# Patient Record
Sex: FEMALE | Race: WHITE | NOT HISPANIC OR LATINO | Employment: FULL TIME | URBAN - METROPOLITAN AREA
[De-identification: names, ages, dates, MRNs, and addresses within clinical notes are randomized per-mention and may not be internally consistent; named-entity substitution may affect disease eponyms.]

---

## 2023-10-13 ENCOUNTER — OFFICE VISIT (OUTPATIENT)
Dept: INTERNAL MEDICINE | Facility: CLINIC | Age: 43
End: 2023-10-13
Payer: COMMERCIAL

## 2023-10-13 VITALS
SYSTOLIC BLOOD PRESSURE: 128 MMHG | HEART RATE: 88 BPM | RESPIRATION RATE: 18 BRPM | OXYGEN SATURATION: 98 % | HEIGHT: 66 IN | DIASTOLIC BLOOD PRESSURE: 84 MMHG | BODY MASS INDEX: 24.78 KG/M2 | TEMPERATURE: 97.3 F | WEIGHT: 154.2 LBS

## 2023-10-13 DIAGNOSIS — Z12.31 ENCOUNTER FOR SCREENING MAMMOGRAM FOR MALIGNANT NEOPLASM OF BREAST: ICD-10-CM

## 2023-10-13 DIAGNOSIS — Z23 FLU VACCINE NEED: ICD-10-CM

## 2023-10-13 DIAGNOSIS — L98.9 FACIAL LESION: ICD-10-CM

## 2023-10-13 DIAGNOSIS — Z12.11 COLON CANCER SCREENING: ICD-10-CM

## 2023-10-13 DIAGNOSIS — Z00.00 ANNUAL PHYSICAL EXAM: Primary | ICD-10-CM

## 2023-10-13 DIAGNOSIS — Z23 NEED FOR TDAP VACCINATION: ICD-10-CM

## 2023-10-13 NOTE — LETTER
"[facility]  Vaccine Consent Form    Patient Name:  Deepika Caban  Patient :  1980     Vaccine(s) Ordered    Fluzone >6 Months ([id number])  Tdap Vaccine Greater Than or Equal To 8yo IM        Screening Checklist  The following questions should be completed prior to vaccination. If you answer "yes" to any question, it does not necessarily mean you should not be vaccinated. It just means we may need to clarify or ask more questions. If a question is unclear, please ask your healthcare provider to explain it.    Yes No   Any fever or moderate to severe illness today (mild illness and/or antibiotic treatment are not contraindications)?     Do you have a history of a serious reaction to any previous vaccinations, such as anaphylaxis, encephalopathy within 7 days, Guillain-Orient syndrome within 6 weeks, seizure?     Have you received any live vaccine(s) in the past month (MMR, ROSALINE)?     Do you have an anaphylactic allergy to latex (DTaP, DTaP-IPV, Hep A, Hep B, MenB, RV, Td, Tdap), baker's yeast (Hep B, HPV), or gelatin (ROSALINE, MMR)?     Do you have an anaphylactic allergy to neomycin (Rabies, ROSALINE, MMR, IPV, Hep A), polymyxin B (IPV), or streptomycin (IPV)?      Any cancer, leukemia, AIDS, or other immune system disorder? (ROSALINE, MMR, RV)     Do you have a parent, brother, or sister with an immune system problem (if immune competence of vaccine recipient clinically verified, can proceed)? (MMR, ROSALINE)     Any recent steroid treatments for >2 weeks, chemotherapy, or radiation treatment? (ROSALINE, MMR)     Have you received antibody-containing blood transfusions or IVIG in the past 11 months (recommended interval is dependent on product)? (MMR, ROSALINE)     Have you taken antiviral drugs (acyclovir, famciclovir, valacyclovir) in the last 24 or 48 hours, respectively (ROSALINE)?      Are you pregnant or planning to become pregnant within 1 month? (ROSALINE, MMR, HPV, IPV, MenB; For hep B- refer to Engerix-B)     For infants, have you " "ever been told your child has had intussusception or a medical emergency involving obstruction of the intestine (RV)? If not for an infant, can skip this question.         *Ordering Physician/APC should be consulted if "yes" is checked by the patient or guardian above.      I have received, read, and understand the Vaccine Information Statement (VIS) for each vaccine ordered above.  I have considered my health status as well as the health status of my close contacts.  I have taken the opportunity to discuss my vaccine questions with my health care provider.   I have requested that the ordered vaccine(s) be given to me.  I understand the benefits and risks of the vaccines.  I understand that I should remain in the clinic for 15 minutes after receiving the vaccine(s).  _________________________________________________________  Signature of Patient or Parent/Legal Guardian ____________________  Date     "

## 2023-10-13 NOTE — PROGRESS NOTES
"  Patient Care Team:  Bruna Hancock APRN as PCP - General (Nurse Practitioner)     Chief Complaint   Patient presents with    Annual Exam    Establish Care               Patient is a 43 y.o. female who presents for her yearly physical exam.     CÉSAR Caban presents today to establish care and would like an annual exam completed. She is a collegiate women's basketball officient and needs a form filled out saying that she is in good health to participate in this.    The patient reports she turned 43-years-old in 03/2023 and her vision \"started to go\". She notes an eye exam scheduled in 11/2023. She feels like she cannot see up close    Her surgeries include a left knee ACL reconstruction in 2000 and teeth extractions.   She notes a COVID-19 vaccination in 08/2021.     She affirms performing self breast exams but has never had a mammogram. She denies any concerning areas.    She notes a maternal aunt with breast cancer who was diagnosed in her 40's and needed a double mastectomy.    The patient is currently menstruating and declines a pap smear today. She states she has never had a pap smear and has had a wife for fifteen years.     The patient denies a family history of colon cancer. She denies any bowel issues.     She is up to date on her dental exam and is seen every six months.     She is interested in obtaining a flu vaccine, tetanus, and COVID-19 booster.    The patient states she went [place] in [place] for eight hours and wore sunglasses and sunscreen. She notes a small bump on the left side of her nose. She notes it does not itch but it bothers her.      Health maintenance/lifestyle:  Health Maintenance   Topic Date Due    COLORECTAL CANCER SCREENING  Never done    COVID-19 Vaccine (3 - 2023-24 season) 09/01/2023    HEPATITIS C SCREENING  Never done    ANNUAL PHYSICAL  Never done    PAP SMEAR  Never done    TDAP/TD VACCINES (2 - Td or Tdap) 10/13/2033    INFLUENZA VACCINE  Completed    " Pneumococcal Vaccine 0-64  Aged Out     Immunization History   Administered Date(s) Administered    COVID-19 (PFIZER) Purple Cap Monovalent 08/01/2021, 08/22/2021    Fluzone (or Fluarix & Flulaval for VFC) >6mos 10/30/2020, 10/13/2023    Tdap 10/13/2023     Cancer-related family history includes Breast cancer (age of onset: 45) in her maternal aunt.   reports being sexually active and has had partner(s) who are female. She reports using the following method of birth control/protection: Same-sex partner.  Social History     Tobacco Use   Smoking Status Never   Smokeless Tobacco Not on file     Social History     Substance and Sexual Activity   Alcohol Use Never         Review of Systems   Constitutional:  Negative for fatigue, fever and unexpected weight loss.   Eyes:  Positive for visual disturbance. Negative for blurred vision, double vision, pain, discharge, redness and itching.        Poor vision   Respiratory:  Negative for cough, shortness of breath and wheezing.    Cardiovascular:  Negative for chest pain, palpitations and leg swelling.   Gastrointestinal:  Negative for abdominal pain, constipation, diarrhea, nausea and vomiting.   Genitourinary:  Negative for difficulty urinating, frequency and urgency.   Musculoskeletal:  Negative for arthralgias and myalgias.   Skin:  Positive for skin lesions (left side of nose). Negative for color change and rash.   Neurological:  Negative for dizziness, weakness and headache.   Hematological:  Negative for adenopathy. Does not bruise/bleed easily.         History  Social History     Socioeconomic History    Marital status:    Tobacco Use    Smoking status: Never   Substance and Sexual Activity    Alcohol use: Never    Drug use: Never    Sexual activity: Yes     Partners: Female     Birth control/protection: Same-sex partner     Comment: Adrienne been  for 15 years     History reviewed. No pertinent past medical history.   Past Surgical History:   Procedure  "Laterality Date    KNEE ACL RECONSTRUCTION Left 2000    WISDOM TOOTH EXTRACTION        No Known Allergies   Family History   Problem Relation Age of Onset    Breast cancer Maternal Aunt 45     No current outpatient medications on file.                  /84 (BP Location: Right arm, Patient Position: Sitting, Cuff Size: Adult)   Pulse 88   Temp 97.3 øF (36.3 øC) (Infrared)   Resp 18   Ht 167.6 cm (66\")   Wt 69.9 kg (154 lb 3.2 oz)   LMP 10/11/2023 (Approximate)   SpO2 98%   BMI 24.89 kg/mý       Physical Exam  Vitals and nursing note reviewed.   Constitutional:       General: She is not in acute distress.     Appearance: Normal appearance. She is well-developed. She is not diaphoretic.   HENT:      Head: Normocephalic and atraumatic.      Right Ear: External ear normal.      Left Ear: External ear normal.      Nose: Nose normal.   Eyes:      General: No scleral icterus.        Right eye: No discharge.         Left eye: No discharge.      Conjunctiva/sclera: Conjunctivae normal.      Pupils: Pupils are equal, round, and reactive to light.   Neck:      Thyroid: No thyromegaly.      Vascular: No carotid bruit or JVD.      Trachea: No tracheal deviation.   Cardiovascular:      Rate and Rhythm: Normal rate and regular rhythm.      Heart sounds: Normal heart sounds. No murmur heard.     No friction rub. No gallop.   Pulmonary:      Effort: Pulmonary effort is normal. No respiratory distress.      Breath sounds: Normal breath sounds. No wheezing or rales.   Chest:      Chest wall: No tenderness.   Breasts:     Breasts are symmetrical.      Right: No inverted nipple, mass, nipple discharge, skin change or tenderness.      Left: No inverted nipple, mass, nipple discharge, skin change or tenderness.   Abdominal:      General: Bowel sounds are normal. There is no distension.      Palpations: Abdomen is soft. There is no mass.      Tenderness: There is no abdominal tenderness.      Hernia: No hernia is present. "   Musculoskeletal:         General: No tenderness or deformity. Normal range of motion.      Cervical back: Normal range of motion and neck supple.   Lymphadenopathy:      Head:      Right side of head: No submental, submandibular, tonsillar, preauricular, posterior auricular or occipital adenopathy.      Left side of head: No submental, submandibular, tonsillar, preauricular, posterior auricular or occipital adenopathy.      Cervical: No cervical adenopathy.   Skin:     General: Skin is warm and dry.      Coloration: Skin is not pale.      Findings: No erythema or rash.      Comments: 2 mm raised flesh toned papule to the left nose adjacent to inner canthus of the eye.   Neurological:      General: No focal deficit present.      Mental Status: She is alert and oriented to person, place, and time.   Psychiatric:         Behavior: Behavior normal. Behavior is cooperative.         Thought Content: Thought content normal.                   Diagnoses and all orders for this visit:    1. Annual physical exam (Primary)  -     CBC (No Diff); Future  -     Comprehensive Metabolic Panel; Future  -     Lipid Panel; Future  -     Hepatitis C antibody; Future    2. Encounter for screening mammogram for malignant neoplasm of breast  -     Mammo screening digital tomosynthesis bilateral w CAD; Future    3. Facial lesion  -     Ambulatory Referral to Dermatology    4. Need for Tdap vaccination  -     Tdap Vaccine Greater Than or Equal To 6yo IM    5. Colon cancer screening  -     Cologuard - Stool, Per Rectum; Future    6. Flu vaccine need  -     Fluzone >6 Months ([id number])    Annual exam  We will order Cologuard and mammogram, routine blood work, hep C screening, update flu and Tdap vaccinations.    Facial lesion  We will refer her to dermatology.    Labs  ordered as above- will notify of results and treat accordingly. If patient has not received results within one week via mychart or letter, they will notify my  office  Immunizations and screenings:   Other preventative/screenings are up-to-date/addressed as noted in the HPI.  Counseling: The patient is advised to continue current healthy lifestyle patterns and return for routine annual checkups      Follow up: Return in about 1 year (around 10/13/2024).  Plan of care discussed with patient. They verbalized understanding and agreement.     Electronically signed by : SHARON North    10/15/2023   07:55 EDT       Transcribed from ambient dictation for SHARON North by Sabina Cole.  10/13/23   14:13 EDT    Patient or patient representative verbalized consent to the visit recording.  I have personally performed the services described in this document as transcribed by the above individual, and it is both accurate and complete.  SHARON North  10/15/2023  07:56 EDT

## 2024-09-12 ENCOUNTER — LAB (OUTPATIENT)
Dept: INTERNAL MEDICINE | Facility: CLINIC | Age: 44
End: 2024-09-12
Payer: COMMERCIAL

## 2024-09-12 DIAGNOSIS — Z00.00 ANNUAL PHYSICAL EXAM: ICD-10-CM

## 2024-09-12 LAB
ALBUMIN SERPL-MCNC: 4 G/DL (ref 3.5–5.2)
ALBUMIN/GLOB SERPL: 1.4 G/DL
ALP SERPL-CCNC: 68 U/L (ref 39–117)
ALT SERPL W P-5'-P-CCNC: 16 U/L (ref 1–33)
ANION GAP SERPL CALCULATED.3IONS-SCNC: 8.4 MMOL/L (ref 5–15)
AST SERPL-CCNC: 17 U/L (ref 1–32)
BILIRUB SERPL-MCNC: 0.5 MG/DL (ref 0–1.2)
BUN SERPL-MCNC: 10 MG/DL (ref 6–20)
BUN/CREAT SERPL: 10.8 (ref 7–25)
CALCIUM SPEC-SCNC: 9.1 MG/DL (ref 8.6–10.5)
CHLORIDE SERPL-SCNC: 107 MMOL/L (ref 98–107)
CHOLEST SERPL-MCNC: 222 MG/DL (ref 0–200)
CO2 SERPL-SCNC: 24.6 MMOL/L (ref 22–29)
CREAT SERPL-MCNC: 0.93 MG/DL (ref 0.57–1)
DEPRECATED RDW RBC AUTO: 38.5 FL (ref 37–54)
EGFRCR SERPLBLD CKD-EPI 2021: 76.9 ML/MIN/1.73
ERYTHROCYTE [DISTWIDTH] IN BLOOD BY AUTOMATED COUNT: 11.8 % (ref 12.3–15.4)
GLOBULIN UR ELPH-MCNC: 2.8 GM/DL
GLUCOSE SERPL-MCNC: 96 MG/DL (ref 65–99)
HCT VFR BLD AUTO: 44.5 % (ref 34–46.6)
HCV AB SER QL: NORMAL
HDLC SERPL-MCNC: 46 MG/DL (ref 40–60)
HGB BLD-MCNC: 15.1 G/DL (ref 12–15.9)
LDLC SERPL CALC-MCNC: 161 MG/DL (ref 0–100)
LDLC/HDLC SERPL: 3.46 {RATIO}
MCH RBC QN AUTO: 30.5 PG (ref 26.6–33)
MCHC RBC AUTO-ENTMCNC: 33.9 G/DL (ref 31.5–35.7)
MCV RBC AUTO: 89.9 FL (ref 79–97)
PLATELET # BLD AUTO: 330 10*3/MM3 (ref 140–450)
PMV BLD AUTO: 11.1 FL (ref 6–12)
POTASSIUM SERPL-SCNC: 4.9 MMOL/L (ref 3.5–5.2)
PROT SERPL-MCNC: 6.8 G/DL (ref 6–8.5)
RBC # BLD AUTO: 4.95 10*6/MM3 (ref 3.77–5.28)
SODIUM SERPL-SCNC: 140 MMOL/L (ref 136–145)
TRIGL SERPL-MCNC: 85 MG/DL (ref 0–150)
VLDLC SERPL-MCNC: 15 MG/DL (ref 5–40)
WBC NRBC COR # BLD AUTO: 7.33 10*3/MM3 (ref 3.4–10.8)

## 2024-09-12 PROCEDURE — 36415 COLL VENOUS BLD VENIPUNCTURE: CPT | Performed by: NURSE PRACTITIONER

## 2024-09-12 PROCEDURE — 86803 HEPATITIS C AB TEST: CPT | Performed by: NURSE PRACTITIONER

## 2024-09-12 PROCEDURE — 80053 COMPREHEN METABOLIC PANEL: CPT | Performed by: NURSE PRACTITIONER

## 2024-09-12 PROCEDURE — 80061 LIPID PANEL: CPT | Performed by: NURSE PRACTITIONER

## 2024-09-12 PROCEDURE — 85027 COMPLETE CBC AUTOMATED: CPT | Performed by: NURSE PRACTITIONER
